# Patient Record
Sex: MALE | Race: WHITE | NOT HISPANIC OR LATINO
[De-identification: names, ages, dates, MRNs, and addresses within clinical notes are randomized per-mention and may not be internally consistent; named-entity substitution may affect disease eponyms.]

---

## 2020-10-29 ENCOUNTER — TRANSCRIPTION ENCOUNTER (OUTPATIENT)
Age: 25
End: 2020-10-29

## 2022-01-14 PROBLEM — Z00.00 ENCOUNTER FOR PREVENTIVE HEALTH EXAMINATION: Status: ACTIVE | Noted: 2022-01-14

## 2022-01-19 ENCOUNTER — APPOINTMENT (OUTPATIENT)
Dept: ORTHOPEDIC SURGERY | Facility: CLINIC | Age: 27
End: 2022-01-19
Payer: COMMERCIAL

## 2022-01-19 VITALS — BODY MASS INDEX: 19.61 KG/M2 | HEIGHT: 73 IN | WEIGHT: 148 LBS

## 2022-01-19 DIAGNOSIS — M25.571 PAIN IN RIGHT ANKLE AND JOINTS OF RIGHT FOOT: ICD-10-CM

## 2022-01-19 PROCEDURE — 99203 OFFICE O/P NEW LOW 30 MIN: CPT

## 2022-01-19 NOTE — PHYSICAL EXAM
[de-identified] : Right Achilles shows a negative Post test. There is some swelling no tenderness his range of motion is normal he does have some intrinsic tightness in his calf and neurovascular exam is normal

## 2022-01-19 NOTE — DISCUSSION/SUMMARY
[Medication Risks Reviewed] : Medication risks reviewed [de-identified] : This patient is an acute exacerbation of a chronic problem with his right Achilles. He needs to foam roller to stretch in general he will ice. She will rest for a few more days he'll come out of his boot. When he runs he'll try and put some ice on it. He still probably hold off on running a half marathon. He could take over-the-counter medication as needed. I don't think he needs to be on an anti-inflammatory. He will follow up as needed. He was advised not to do any sprinting type of work for risk of rupture.

## 2022-01-19 NOTE — HISTORY OF PRESENT ILLNESS
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 He's had recurrent episodes of right Achilles tendinitis. Last was at some point in 2020. This happened just recently. He did have an episode of corona virus and then had taken some time off and then resumed his running. He was training for the [place] half marathon which is in March. He specimens are sent. He is not very good at stretching.

## 2022-01-19 NOTE — REASON FOR VISIT
[Initial Visit] : an initial visit for [FreeTextEntry2] : RIGHT HEEL/ACHILES PAIN AND TENDERNESS - DIFFICULTY WEIGHT BEARING - CURRENTLY WEARING A CAM WALKER

## 2022-01-19 NOTE — REVIEW OF SYSTEMS
[Arthralgia] : arthralgia [Joint Pain] : no joint pain [Joint Stiffness] : no joint stiffness [Negative] : Heme/Lymph